# Patient Record
Sex: MALE | Race: WHITE | URBAN - METROPOLITAN AREA
[De-identification: names, ages, dates, MRNs, and addresses within clinical notes are randomized per-mention and may not be internally consistent; named-entity substitution may affect disease eponyms.]

---

## 2017-02-22 ENCOUNTER — OFFICE VISIT (OUTPATIENT)
Dept: FAMILY MEDICINE | Facility: CLINIC | Age: 52
End: 2017-02-22

## 2017-02-22 DIAGNOSIS — L72.3 INFLAMED EPIDERMOID CYST OF SKIN: Primary | ICD-10-CM

## 2017-02-22 PROCEDURE — 87070 CULTURE OTHR SPECIMN AEROBIC: CPT | Performed by: FAMILY MEDICINE

## 2017-02-22 PROCEDURE — 10061 I&D ABSCESS COMP/MULTIPLE: CPT | Performed by: FAMILY MEDICINE

## 2017-02-22 RX ORDER — SULFAMETHOXAZOLE/TRIMETHOPRIM 800-160 MG
1 TABLET ORAL 2 TIMES DAILY
Qty: 20 TABLET | Refills: 0 | Status: CANCELLED | OUTPATIENT
Start: 2017-02-22

## 2017-02-22 RX ORDER — CEPHALEXIN 500 MG/1
500 CAPSULE ORAL 3 TIMES DAILY
Qty: 30 CAPSULE | Refills: 0 | Status: SHIPPED | OUTPATIENT
Start: 2017-02-22 | End: 2017-03-04

## 2017-02-22 NOTE — MR AVS SNAPSHOT
After Visit Summary   2/22/2017    Maxx Yadav    MRN: [id number]           Patient Information     Date Of Birth          1965        Visit Information        Provider Department      2/22/2017 9:20 AM Davina Brambila MD [facility] - Primary Care Skin        Today's Diagnoses     Inflamed epidermoid cyst of skin    -  1      Care Instructions    FUTURE APPOINTMENTS    Follow up in 2 day(s) for evaluation of the packing and incision site.    Follow up in 6-8 weeks for excision of cyst.    INCISION AND DRAINAGE POST-CARE INSTRUCTIONS  The area was opened and pus was drained and cultured. A small ribbon of sterile gauze was placed into the abscess cavity. This provides the body an exit for any more pus and fluid to drain out, which will help the area heal much more quickly.    The next time you shower or bathe, remove the dressing and clean the area with soap and water. Neither soap, water or shampoo will hurt the surgical area. Apply some Vaseline, and use a bandage over the top. Take care to avoid pulling out the wick of the packed gauze. However, if it does come out, do not try to replace it. Dry the area well with a towel or hairdryer on cool setting. Cover the wound with a dry dressing as we expect the area to continue draining.    Please limit strenuous activity of this area to allow for optimal healing. Do not use Neosporin or other triple antibiotic ointments. Only use Vaseline.    Signs of Infection:  Thankfully this is rare. However if you notice increasing redness, increasing swelling at the site, increasing pain or pain with movement, fever, chills or other signs of infection, please call us at: [phone].    ORAL ANTIBIOTIC  Take by mouth 1 capsule/tablet of cephalexin 500 mg three time(s) a day for 10 days. Make sure to finish the entire antibiotic regimen.    PAIN CONTROL INSTRUCTIONS    First, try either acetaminophen (Tylenol) or a non-steroidal  anti-inflammatory drug (NSAID), such as ibuprofen (Advil) or naproxen (Aleve, Naprosyn).    Second, try combining both acetaminophen and an NSAID - often the combination will work better then either one alone.        Follow-ups after your visit        Follow-up notes from your care team     Return in about 2 days (around 2/24/2017).      Who to contact     If you have questions or need follow up information about today's clinic visit or your schedule please contact [facility] - PRIMARY CARE SKIN directly at [phone].  Normal or non-critical lab and imaging results will be communicated to you by Clickberryhart, letter or phone within 4 business days after the clinic has received the results. If you do not hear from us within 7 days, please contact the clinic through [employer] or phone. If you have a critical or abnormal lab result, we will notify you by phone as soon as possible.  Submit refill requests through [employer] or call your pharmacy and they will forward the refill request to us. Please allow 3 business days for your refill to be completed.          Additional Information About Your Visit        ClickberryharAutobase Information     [employer]or gives you secure access to your electronic health record. If you see a primary care provider, you can also send messages to your care team and make appointments. If you have questions, please call your primary care clinic.  If you do not have a primary care provider, please call [phone] and they will assist you.        Care EveryWhere ID     This is your Care EveryWhere ID. This could be used by other organizations to access your [facility] medical records  [id number]         Blood Pressure from Last 3 Encounters:   11/18/16 124/86   06/14/16 (!) 137/92   05/27/16 122/84    Weight from Last 3 Encounters:   11/18/16 228 lb (103.4 kg)   06/14/16 219 lb 14.4 oz (99.7 kg)   05/27/16 222 lb 3.2 oz (100.8 kg)              We Performed the Following     DRAIN SKIN ABSCESS  COMPLICATED/MULTIPLE     Wound Culture Aerobic Bacterial          Today's Medication Changes          These changes are accurate as of: 2/22/17  9:39 AM.  If you have any questions, ask your nurse or doctor.               Start taking these medicines.        Dose/Directions    cephALEXin 500 MG capsule   Commonly known as:  KEFLEX   Used for:  Inflamed epidermoid cyst of skin   Started by:  Davina Brambila MD        Dose:  500 mg   Take 1 capsule (500 mg) by mouth 3 times daily for 10 days   Quantity:  30 capsule   Refills:  0            Where to get your medicines      These medications were sent to [id number] - [place] - [address]  [address], [place] [address]     Phone:  [phone]     cephALEXin 500 MG capsule                Primary Care Provider Office Phone # Fax #    Raffy Strickland PA-C [phone]       [facility] [address] [place] [address] [address]  [place] [address]        Thank you!     Thank you for choosing [facility] - PRIMARY CARE SKIN  for your care. Our goal is always to provide you with excellent care. Hearing back from our patients is one way we can continue to improve our services. Please take a few minutes to complete the written survey that you may receive in the mail after your visit with us. Thank you!             Your Updated Medication List - Protect others around you: Learn how to safely use, store and throw away your medicines at www.disposemymeds.org.          This list is accurate as of: 2/22/17  9:39 AM.  Always use your most recent med list.                   Brand Name Dispense Instructions for use    buPROPion 300 MG 24 hr tablet    WELLBUTRIN XL    90 tablet    Take 1 tablet (300 mg) by mouth every morning       cephALEXin 500 MG capsule    KEFLEX    30 capsule    Take 1 capsule (500 mg) by mouth 3 times daily for 10 days       escitalopram 10 MG tablet    LEXAPRO    90 tablet     Take 1 tablet (10 mg) by mouth daily       hydrocortisone 25 MG Suppository    ANUSOL-HC    28 suppository    Place 1 suppository (25 mg) rectally 2 times daily       warfarin 7.5 MG tablet    COUMADIN     Take by mouth daily

## 2017-02-22 NOTE — PATIENT INSTRUCTIONS
FUTURE APPOINTMENTS    Follow up in 2 day(s) for evaluation of the packing and incision site.    Follow up in 6-8 weeks for excision of cyst.    INCISION AND DRAINAGE POST-CARE INSTRUCTIONS  The area was opened and pus was drained and cultured. A small ribbon of sterile gauze was placed into the abscess cavity. This provides the body an exit for any more pus and fluid to drain out, which will help the area heal much more quickly.    The next time you shower or bathe, remove the dressing and clean the area with soap and water. Neither soap, water or shampoo will hurt the surgical area. Apply some Vaseline, and use a bandage over the top. Take care to avoid pulling out the wick of the packed gauze. However, if it does come out, do not try to replace it. Dry the area well with a towel or hairdryer on cool setting. Cover the wound with a dry dressing as we expect the area to continue draining.    Please limit strenuous activity of this area to allow for optimal healing. Do not use Neosporin or other triple antibiotic ointments. Only use Vaseline.    Signs of Infection:  Thankfully this is rare. However if you notice increasing redness, increasing swelling at the site, increasing pain or pain with movement, fever, chills or other signs of infection, please call us at: [phone].    ORAL ANTIBIOTIC  Take by mouth 1 capsule/tablet of cephalexin 500 mg three time(s) a day for 10 days. Make sure to finish the entire antibiotic regimen.    PAIN CONTROL INSTRUCTIONS    First, try either acetaminophen (Tylenol) or a non-steroidal anti-inflammatory drug (NSAID), such as ibuprofen (Advil) or naproxen (Aleve, Naprosyn).    Second, try combining both acetaminophen and an NSAID - often the combination will work better then either one alone.

## 2017-02-24 ENCOUNTER — OFFICE VISIT (OUTPATIENT)
Dept: FAMILY MEDICINE | Facility: CLINIC | Age: 52
End: 2017-02-24

## 2017-02-24 DIAGNOSIS — Z48.00 CHANGE OR REMOVAL OF WOUND PACKING: Primary | ICD-10-CM

## 2017-02-24 LAB
BACTERIA SPEC CULT: NO GROWTH
MICRO REPORT STATUS: NORMAL
SPECIMEN SOURCE: NORMAL

## 2017-02-24 PROCEDURE — 99207 ZZC NO CHARGE NURSE ONLY: CPT | Performed by: FAMILY MEDICINE

## 2017-02-24 NOTE — MR AVS SNAPSHOT
After Visit Summary   2/24/2017    Maxx Yadav    MRN: [id number]           Patient Information     Date Of Birth          1965        Visit Information        Provider Department      2/24/2017 2:00 PM Davina Brambila MD [facility] Primary Care Skin        Today's Diagnoses     Change or removal of wound packing    -  1       Follow-ups after your visit        Your next 10 appointments already scheduled     Feb 27, 2017  2:00 PM CST   Office Visit with Davina Brambila MD   [facility] Primary Care Skin ([facility] )    [address] [place] [address]  [address] [place] [id number]   [phone]           Bring a current list of meds and any records pertaining to this visit.  For Physicals, please bring immunization records and any forms needing to be filled out.  Please arrive 10 minutes early to complete paperwork.              Who to contact     If you have questions or need follow up information about today's clinic visit or your schedule please contact [facility] PRIMARY CARE SKIN directly at [phone].  Normal or non-critical lab and imaging results will be communicated to you by Aperio Technologieshart, letter or phone within 4 business days after the clinic has received the results. If you do not hear from us within 7 days, please contact the clinic through Cupointt or phone. If you have a critical or abnormal lab result, we will notify you by phone as soon as possible.  Submit refill requests through [employer] or call your pharmacy and they will forward the refill request to us. Please allow 3 business days for your refill to be completed.          Additional Information About Your Visit        MyChart Information     [employer] gives you secure access to your electronic health record. If you see a primary care provider, you can also send messages to your care team and make appointments. If you have questions, please call your primary  care clinic.  If you do not have a primary care provider, please call [phone] and they will assist you.        Care EveryWhere ID     This is your Care EveryWhere ID. This could be used by other organizations to access your [facility] medical records  [id number]         Blood Pressure from Last 3 Encounters:   11/18/16 124/86   06/14/16 (!) 137/92   05/27/16 122/84    Weight from Last 3 Encounters:   11/18/16 228 lb (103.4 kg)   06/14/16 219 lb 14.4 oz (99.7 kg)   05/27/16 222 lb 3.2 oz (100.8 kg)              Today, you had the following     No orders found for display       Primary Care Provider Office Phone # Fax #    Raffy Strickland PA-C [phone]       [facility] [address]  [place] [address]        Thank you!     Thank you for choosing [facility] - PRIMARY CARE SKIN  for your care. Our goal is always to provide you with excellent care. Hearing back from our patients is one way we can continue to improve our services. Please take a few minutes to complete the written survey that you may receive in the mail after your visit with us. Thank you!             Your Updated Medication List - Protect others around you: Learn how to safely use, store and throw away your medicines at www.disposemymeds.org.          This list is accurate as of: 2/24/17  2:30 PM.  Always use your most recent med list.                   Brand Name Dispense Instructions for use    buPROPion 300 MG 24 hr tablet    WELLBUTRIN XL    90 tablet    Take 1 tablet (300 mg) by mouth every morning       cephALEXin 500 MG capsule    KEFLEX    30 capsule    Take 1 capsule (500 mg) by mouth 3 times daily for 10 days       escitalopram 10 MG tablet    LEXAPRO    90 tablet    Take 1 tablet (10 mg) by mouth daily       hydrocortisone 25 MG Suppository    ANUSOL-HC    28 suppository    Place 1 suppository (25 mg) rectally 2 times daily       warfarin 7.5 MG tablet    COUMADIN     Take by  mouth daily

## 2017-02-24 NOTE — PROGRESS NOTES
[facility] - PRIMARY CARE SKIN    CC : Packing advancement  SUBJECTIVE:                                                    Maxx Yadav is a 51 year old male who presents to clinic today for follow-up evaluation of a wound with packing placed 3 days ago after an I&D for an infected epidermal cyst.    They are taking no antibiotic.  Tenderness : No.  History of previous sebaceous cysts : Yes: .        Refer to electronic medical record (EMR) for past medical history and medications.      ROS : 14 point review of systems was negative except the symptoms listed above in the HPI.    This document serves as a record of the services and decisions personally performed and made by Kate Brambila MD. It was created on her behalf by Neymar Guzmán, a trained medical scribe.  The creation of this document is based on the scribe's personal observations and the provider's statements to the medical scribe.  Neymar Guzmán, February 24, 2017 2:18 PM      OBJECTIVE:                                                    GENERAL: healthy, alert and no distress  SKIN: Mendez Skin Type - II.  Trunk were examined. The dermatoscope was used to help evaluate pigmented lesions.  Skin Pertinent Findings:  Location : upper back, Less erythema and swelling. No active drainage.  Packing was removed and repacked. Dressing was applied    Diagnostic Test Results:  Results for orders placed or performed in visit on 02/22/17   Wound Culture Aerobic Bacterial   Result Value Ref Range    Specimen Description Skin ON Back MIDLINE     Culture Micro No growth     Micro Report Status FINAL 02/24/2017        MDM :       ASSESSMENT:                                                      Encounter Diagnosis   Name Primary?     Change or removal of wound packing Yes         PLAN:                                                    Recheck in 3 days for packing removal  Change dressing daily      PROCEDURES:                                                     None.    TT: 20 minutes.  CT: 15 minutes.      The information in this document, created by the medical scribe for me, accurately reflects the services I personally performed and the decisions made by me. I have reviewed and approved this document for accuracy prior to leaving the patient care area.  Kate Brambila MD February 24, 2017 2:18 PM  [facility] - PRIMARY CARE SKIN

## 2017-02-27 ENCOUNTER — OFFICE VISIT (OUTPATIENT)
Dept: FAMILY MEDICINE | Facility: CLINIC | Age: 52
End: 2017-02-27

## 2017-02-27 DIAGNOSIS — Z48.00 ENCOUNTER FOR ABSCESS PACKING REMOVAL: Primary | ICD-10-CM

## 2017-02-27 PROCEDURE — 99024 POSTOP FOLLOW-UP VISIT: CPT | Performed by: FAMILY MEDICINE

## 2017-02-27 RX ORDER — CEPHALEXIN 500 MG/1
500 CAPSULE ORAL 3 TIMES DAILY
Qty: 21 CAPSULE | Refills: 0 | Status: SHIPPED | OUTPATIENT
Start: 2017-02-27 | End: 2017-03-06

## 2017-02-27 NOTE — PROGRESS NOTES
[facility] - PRIMARY CARE SKIN    CC : Packing advancement  SUBJECTIVE:                                                    Maxx Yadav is a 51 year old male who presents to clinic today for follow-up evaluation of a wound with packing placed 3 days ago after an I&D for an infected epidermoid cyst on the back. He has been changing the dressing daily. The patient's girlfriend did notice a strip of gauze that came out with one dressing change.    Oral cephalexin antibiotic has been discontinued, because he accidentally dropped them in the toilet.  Tenderness : No.  History of previous sebaceous cysts : Yes: .    Refer to electronic medical record (EMR) for past medical history and medications.    ROS : 14 point review of systems was negative except the symptoms listed above in the HPI.    This document serves as a record of the services and decisions personally performed and made by Kate Brambila MD. It was created on her behalf by Neymar Guzámn, a trained medical scribe.  The creation of this document is based on the scribe's personal observations and the provider's statements to the medical scribe.  Neymar Guzmán, February 27, 2017 2:09 PM      OBJECTIVE:                                                    GENERAL: healthy, alert and no distress  SKIN: Mendez Skin Type - II.  Trunk were examined. The dermatoscope was used to help evaluate pigmented lesions.  Skin Pertinent Findings:  Upper back, midline : Less erythema and swelling. No active drainage.  Packing had fallen out. Irrigated. Dressing applied    Diagnostic Test Results:  Results for orders placed or performed in visit on 02/22/17   Wound Culture Aerobic Bacterial   Result Value Ref Range    Specimen Description Skin ON Back MIDLINE     Culture Micro No growth     Micro Report Status FINAL 02/24/2017            ASSESSMENT:                                                      Encounter Diagnosis   Name Primary?     Encounter for abscess packing removal  Yes         PLAN:                                                    Patient Instructions   FUTURE APPOINTMENTS  Follow up as needed.    INCISION AND DRAINAGE POST-CARE INSTRUCTIONS  The area was opened and pus was drained and cultured. A small ribbon of sterile gauze was placed into the abscess cavity. This provides the body an exit for any more pus and fluid to drain out, which will help the area heal much more quickly.    The next time you shower or bathe, remove the dressing and clean the area with soap and water. Neither soap, water or shampoo will hurt the surgical area. Apply some Vaseline, and use a bandage over the top. Take care to avoid pulling out the wick of the packed gauze. However, if it does come out, do not try to replace it. Dry the area well with a towel or hairdryer on cool setting. Cover the wound with a dry dressing as we expect the area to continue draining.    Please limit strenuous activity of this area to allow for optimal healing. Do not use Neosporin or other triple antibiotic ointments. Only use Vaseline.    Signs of Infection:  Thankfully this is rare. However if you notice increasing redness, increasing swelling at the site, increasing pain or pain with movement, fever, chills or other signs of infection, please call us at: [phone].    ORAL ANTIBIOTIC  Discontinue oral cephalexin (Keflex).    PAIN CONTROL INSTRUCTIONS    First, try either acetaminophen (Tylenol) or a non-steroidal anti-inflammatory drug (NSAID), such as ibuprofen (Advil) or naproxen (Aleve, Naprosyn).    Second, try combining both acetaminophen and an NSAID - often the combination will work better then either one alone.        PROCEDURES:                                                    None.    TT: 20 minutes.  CT: 15 minutes.      The information in this document, created by the medical scribe for me, accurately reflects the services I personally performed and the decisions made by me. I have reviewed and  approved this document for accuracy prior to leaving the patient care area.  Kate Brambila MD February 27, 2017 2:08 PM  [facility] - PRIMARY CARE SKIN

## 2017-02-27 NOTE — PATIENT INSTRUCTIONS
"FUTURE APPOINTMENTS  Follow up as needed if a cyst begins to recur at the site.    WOUND CARE INSTRUCTIONS  1. After 24 hours, change dressing daily.  2. Wash hands before every dressing change.  3. Wash the wound area with a mild soap, then rinse  4. Gently pat dry with a sterile gauze or Q-tip.  5. Apply Vaseline, Aquaphor, Polysporin ointment or Bacitracin ointment over entire wound. Do NOT use Neosporin - as many people react to neomycin.  6. Finally, cover with a bandage or sterile non-stick gauze with micropore paper tape.  7. Repeat once daily until wound has healed.    Do not let the wound dry out.  The wound will heal faster and with better cosmetic results if routinely kept moist with step #5. It is a false belief that a wound heals better when it is exposed to air and allowed to dry out.      Soap, water and shampoo will not hurt this area.    Do not go swimming or take baths, but showering is encouraged.    Limit use of the area where the procedure was done for a few days to allow for optimal healing.    If you experience bleeding:  Repeat steps #2-5 and hold firm pressure on the area for 10 minutes without checking to see if the bleeding has stopped. \"Checking\" pulls off the protective wound clot and restarts the bleeding all over again. Re-apply pressure for 10 minutes if necessary to stop bleeding.  Use additional sterile gauze and tape to maintain pressure once bleeding has stopped.    Signs of Infection:  Infection can occur in any area where skin has been disrupted.  If you notice persistent redness, swelling, colored drainage, increasing pain, fever or other signs of infection, please call us at: [phone] and ask to have me or my colleague paged. We will call you back to discuss.    PATIENT INFORMATION : WOUNDS  During the healing process you will notice a number of changes. All wounds develop a small halo of redness surrounding the wound.  This means healing is occurring. Severe itching " with extensive redness usually indicates sensitivity to the ointment or bandage tape used to dress the wound.  You should call our office if this develops.      Swelling  and/or discoloration around your surgical site is common, particularly when performed around the eye.    All wounds normally drain.  The larger the wound the more drainage there will be.  After 7-10 days, you will notice the wound beginning to shrink and new skin will begin to grow.  The wound is healed when you can see skin has formed over the entire area.  A healed wound has a healthy, shiny look to the surface and is red to dark pink in color to normalize.  Wounds may take approximately 4-6 weeks to heal.  Larger wounds may take 6-8 weeks. After the wound is healed you may discontinue dressing changes.    You may experience a sensation of tightness as your wound heals. This is normal and will gradually subside.    Your healed wound may be sensitive to temperature changes. This sensitivity improves with time, but if you re having a lot of discomfort, try to avoid temperature extremes.    Patients frequently experience itching after their wound appears to have healed because of the continue healing under the skin.  Plain Vaseline will help relieve the itching.    ORAL ANTIBIOTIC  Take by mouth 1 capsule/tablet of cephalexin 500 mg three time(s) a day for 7 more days. Make sure to finish the entire antibiotic regimen.      PAIN CONTROL INSTRUCTIONS    First, try either acetaminophen (Tylenol) or a non-steroidal anti-inflammatory drug (NSAID), such as ibuprofen (Advil) or naproxen (Aleve, Naprosyn).    Second, try combining both acetaminophen and an NSAID - often the combination will work better then either one alone.

## 2017-02-27 NOTE — MR AVS SNAPSHOT
"              After Visit Summary   2/27/2017    Maxx Yadav    MRN: [id number]           Patient Information     Date Of Birth          1965        Visit Information        Provider Department      2/27/2017 2:00 PM Davina Brambila MD [facility] - Primary Care Skin        Today's Diagnoses     Encounter for abscess packing removal    -  1      Care Instructions    FUTURE APPOINTMENTS  Follow up as needed if a cyst begins to recur at the site.    WOUND CARE INSTRUCTIONS  1. After 24 hours, change dressing daily.  2. Wash hands before every dressing change.  3. Wash the wound area with a mild soap, then rinse  4. Gently pat dry with a sterile gauze or Q-tip.  5. Apply Vaseline, Aquaphor, Polysporin ointment or Bacitracin ointment over entire wound. Do NOT use Neosporin - as many people react to neomycin.  6. Finally, cover with a bandage or sterile non-stick gauze with micropore paper tape.  7. Repeat once daily until wound has healed.    Do not let the wound dry out.  The wound will heal faster and with better cosmetic results if routinely kept moist with step #5. It is a false belief that a wound heals better when it is exposed to air and allowed to dry out.      Soap, water and shampoo will not hurt this area.    Do not go swimming or take baths, but showering is encouraged.    Limit use of the area where the procedure was done for a few days to allow for optimal healing.    If you experience bleeding:  Repeat steps #2-5 and hold firm pressure on the area for 10 minutes without checking to see if the bleeding has stopped. \"Checking\" pulls off the protective wound clot and restarts the bleeding all over again. Re-apply pressure for 10 minutes if necessary to stop bleeding.  Use additional sterile gauze and tape to maintain pressure once bleeding has stopped.    Signs of Infection:  Infection can occur in any area where skin has been disrupted.  If you notice persistent redness, swelling, " colored drainage, increasing pain, fever or other signs of infection, please call us at: [phone] and ask to have me or my colleague paged. We will call you back to discuss.    PATIENT INFORMATION : WOUNDS  During the healing process you will notice a number of changes. All wounds develop a small halo of redness surrounding the wound.  This means healing is occurring. Severe itching with extensive redness usually indicates sensitivity to the ointment or bandage tape used to dress the wound.  You should call our office if this develops.      Swelling  and/or discoloration around your surgical site is common, particularly when performed around the eye.    All wounds normally drain.  The larger the wound the more drainage there will be.  After 7-10 days, you will notice the wound beginning to shrink and new skin will begin to grow.  The wound is healed when you can see skin has formed over the entire area.  A healed wound has a healthy, shiny look to the surface and is red to dark pink in color to normalize.  Wounds may take approximately 4-6 weeks to heal.  Larger wounds may take 6-8 weeks. After the wound is healed you may discontinue dressing changes.    You may experience a sensation of tightness as your wound heals. This is normal and will gradually subside.    Your healed wound may be sensitive to temperature changes. This sensitivity improves with time, but if you re having a lot of discomfort, try to avoid temperature extremes.    Patients frequently experience itching after their wound appears to have healed because of the continue healing under the skin.  Plain Vaseline will help relieve the itching.    ORAL ANTIBIOTIC  Take by mouth 1 capsule/tablet of cephalexin 500 mg three time(s) a day for 7 more days. Make sure to finish the entire antibiotic regimen.      PAIN CONTROL INSTRUCTIONS    First, try either acetaminophen (Tylenol) or a non-steroidal anti-inflammatory drug (NSAID), such as ibuprofen  (Advil) or naproxen (Aleve, Naprosyn).    Second, try combining both acetaminophen and an NSAID - often the combination will work better then either one alone.        Follow-ups after your visit        Follow-up notes from your care team     Return if symptoms worsen or fail to improve.      Who to contact     If you have questions or need follow up information about today's clinic visit or your schedule please contact [facility] - PRIMARY CARE SKIN directly at [phone].  Normal or non-critical lab and imaging results will be communicated to you by Compressushart, letter or phone within 4 business days after the clinic has received the results. If you do not hear from us within 7 days, please contact the clinic through Compressushart or phone. If you have a critical or abnormal lab result, we will notify you by phone as soon as possible.  Submit refill requests through Miradia or call your pharmacy and they will forward the refill request to us. Please allow 3 business days for your refill to be completed.          Additional Information About Your Visit        CompressusharNeocis Information     Miradia gives you secure access to your electronic health record. If you see a primary care provider, you can also send messages to your care team and make appointments. If you have questions, please call your primary care clinic.  If you do not have a primary care provider, please call [phone] and they will assist you.        Care EveryWhere ID     This is your Care EveryWhere ID. This could be used by other organizations to access your [facility] medical records  [id number]         Blood Pressure from Last 3 Encounters:   11/18/16 124/86   06/14/16 (!) 137/92   05/27/16 122/84    Weight from Last 3 Encounters:   11/18/16 228 lb (103.4 kg)   06/14/16 219 lb 14.4 oz (99.7 kg)   05/27/16 222 lb 3.2 oz (100.8 kg)              Today, you had the following     No orders found for display       Primary Care Provider Office Phone # Fax #     Raffy Strickland PA-C [phone]       [facility]3] [place] [address]  [place] [address]        Thank you!     Thank you for choosing [facility]3] - PRIMARY CARE [facility]  for your care. Our goal is always to provide you with excellent care. Hearing back from our patients is one way we can continue to improve our services. Please take a few minutes to complete the written survey that you may receive in the mail after your visit with us. Thank you!             Your Updated Medication List - Protect others around you: Learn how to safely use, store and throw away your medicines at www.disposemymeds.org.          This list is accurate as of: 2/27/17  2:14 PM.  Always use your most recent med list.                   Brand Name Dispense Instructions for use    buPROPion 300 MG 24 hr tablet    WELLBUTRIN XL    90 tablet    Take 1 tablet (300 mg) by mouth every morning       cephALEXin 500 MG capsule    KEFLEX    30 capsule    Take 1 capsule (500 mg) by mouth 3 times daily for 10 days       escitalopram 10 MG tablet    LEXAPRO    90 tablet    Take 1 tablet (10 mg) by mouth daily       hydrocortisone 25 MG Suppository    ANUSOL-HC    28 suppository    Place 1 suppository (25 mg) rectally 2 times daily       warfarin 7.5 MG tablet    COUMADIN     Take by mouth daily

## 2017-04-28 ENCOUNTER — OFFICE VISIT (OUTPATIENT)
Dept: FAMILY MEDICINE | Facility: CLINIC | Age: 52
End: 2017-04-28

## 2017-04-28 VITALS
WEIGHT: 233 LBS | BODY MASS INDEX: 31.56 KG/M2 | HEART RATE: 78 BPM | OXYGEN SATURATION: 100 % | SYSTOLIC BLOOD PRESSURE: 130 MMHG | DIASTOLIC BLOOD PRESSURE: 82 MMHG | TEMPERATURE: 97.8 F | HEIGHT: 72 IN

## 2017-04-28 DIAGNOSIS — F33.9 RECURRENT MAJOR DEPRESSIVE DISORDER, REMISSION STATUS UNSPECIFIED (H): ICD-10-CM

## 2017-04-28 DIAGNOSIS — N50.819 TESTICULAR PAIN: Primary | ICD-10-CM

## 2017-04-28 PROCEDURE — 99214 OFFICE O/P EST MOD 30 MIN: CPT | Performed by: PHYSICIAN ASSISTANT

## 2017-04-28 RX ORDER — ESCITALOPRAM OXALATE 10 MG/1
10 TABLET ORAL DAILY
Qty: 90 TABLET | Refills: 1 | Status: SHIPPED | OUTPATIENT
Start: 2017-04-28 | End: 2017-07-11

## 2017-04-28 RX ORDER — BUPROPION HYDROCHLORIDE 300 MG/1
300 TABLET ORAL EVERY MORNING
Qty: 90 TABLET | Refills: 1 | Status: SHIPPED | OUTPATIENT
Start: 2017-04-28 | End: 2017-12-29

## 2017-04-28 NOTE — NURSING NOTE
Chief Complaint   Patient presents with     Recheck Medication       Initial /82 (BP Location: Left arm, Patient Position: Chair, Cuff Size: Adult Regular)  Pulse 78  Temp 97.8  F (36.6  C) (Tympanic)  Ht 6' (1.829 m)  Wt 233 lb (105.7 kg)  SpO2 100%  BMI 31.6 kg/m2 Estimated body mass index is 31.6 kg/(m^2) as calculated from the following:    Height as of this encounter: 6' (1.829 m).    Weight as of this encounter: 233 lb (105.7 kg).  Medication Reconciliation: complete

## 2017-04-28 NOTE — MR AVS SNAPSHOT
After Visit Summary   4/28/2017    Maxx Yadav    MRN: [id number]           Patient Information     Date Of Birth          1965        Visit Information        Provider Department      4/28/2017 10:00 AM Raffy Strickland PA-C [facility] [place]        Today's Diagnoses     Testicular pain    -  1    Recurrent major depressive disorder, remission status unspecified (H)           Follow-ups after your visit        Additional Services     UROLOGY ADULT REFERRAL       Your provider has referred you to: [facility]: [facility] - [place] [phone]   [email or web address]  N: [facility] - [place] [phone]   [email or web address]    Please be aware that coverage of these services is subject to the terms and limitations of your health insurance plan.  Call member services at your health plan with any benefit or coverage questions.      Please bring the following with you to your appointment:    (1) Any X-Rays, CTs or MRIs which have been performed.  Contact the facility where they were done to arrange for  prior to your scheduled appointment.    (2) List of current medications  (3) This referral request   (4) Any documents/labs given to you for this referral                  Who to contact     If you have questions or need follow up information about today's clinic visit or your schedule please contact [facility] directly at [phone].  Normal or non-critical lab and imaging results will be communicated to you by MyChart, letter or phone within 4 business days after the clinic has received the results. If you do not hear from us within 7 days, please contact the clinic through Q Designhart or phone. If you have a critical or abnormal lab result, we will notify you by phone as soon as possible.  Submit refill requests through BlogRadio or call your pharmacy  and they will forward the refill request to us. Please allow 3 business days for your refill to be completed.          Additional Information About Your Visit        MonitorTech Corporationhart Information     MobilePro gives you secure access to your electronic health record. If you see a primary care provider, you can also send messages to your care team and make appointments. If you have questions, please call your primary care clinic.  If you do not have a primary care provider, please call [phone] and they will assist you.        Care EveryWhere ID     This is your Care EveryWhere ID. This could be used by other organizations to access your [facility] medical records  [id number]        Your Vitals Were     Pulse Temperature Height Pulse Oximetry BMI (Body Mass Index)       78 97.8  F (36.6  C) (Tympanic) 6' (1.829 m) 100% 31.6 kg/m2        Blood Pressure from Last 3 Encounters:   04/28/17 130/82   11/18/16 124/86   06/14/16 (!) 137/92    Weight from Last 3 Encounters:   04/28/17 233 lb (105.7 kg)   11/18/16 228 lb (103.4 kg)   06/14/16 219 lb 14.4 oz (99.7 kg)              We Performed the Following     DEPRESSION ACTION PLAN (DAP)     UROLOGY ADULT REFERRAL          Where to get your medicines      These medications were sent to [id number] - [place] - [id number]  [address], [place] [address]     Phone:  [phone]     buPROPion 300 MG 24 hr tablet    escitalopram 10 MG tablet          Primary Care Provider Office Phone # Fax #    Raffy Strickland PA-C [phone]       [facility] [place] [address] [place] [address] [address]  [place] [address]        Thank you!     Thank you for choosing [facility] [place]  for your care. Our goal is always to provide you with excellent care. Hearing back from our patients is one way we can continue to improve our services. Please take a few minutes to complete the written survey that you may receive in the mail  after your visit with us. Thank you!             Your Updated Medication List - Protect others around you: Learn how to safely use, store and throw away your medicines at www.disposemymeds.org.          This list is accurate as of: 4/28/17 10:35 AM.  Always use your most recent med list.                   Brand Name Dispense Instructions for use    buPROPion 300 MG 24 hr tablet    WELLBUTRIN XL    90 tablet    Take 1 tablet (300 mg) by mouth every morning       escitalopram 10 MG tablet    LEXAPRO    90 tablet    Take 1 tablet (10 mg) by mouth daily       hydrocortisone 25 MG Suppository    ANUSOL-HC    28 suppository    Place 1 suppository (25 mg) rectally 2 times daily       warfarin 7.5 MG tablet    COUMADIN     Take by mouth daily

## 2017-04-28 NOTE — LETTER
My Depression Action Plan  Name: Maxx Yadav   Date of Birth 1965  Date: 4/28/2017    My doctor: Raffy Strickland   My clinic: [address] [address]  [phone]          GREEN    ZONE   Good Control    What it looks like:     Things are going generally well. You have normal up s and down s. You may even feel depressed from time to time, but bad moods usually last less than a day.   What you need to do:  1. Continue to care for yourself (see self care plan)  2. Check your depression survival kit and update it as needed  3. Follow your physician s recommendations including any medication.  4. Do not stop taking medication unless you consult with your physician first.           YELLOW         ZONE Getting Worse    What it looks like:     Depression is starting to interfere with your life.     It may be hard to get out of bed; you may be starting to isolate yourself from others.    Symptoms of depression are starting to last most all day and this has happened for several days.     You may have suicidal thoughts but they are not constant.   What you need to do:     1. Call your care team, your response to treatment will improve if you keep your care team informed of your progress. Yellow periods are signs an adjustment may need to be made.     2. Continue your self-care, even if you have to fake it!    3. Talk to someone in your support network    4. Open up your depression survival kit           RED    ZONE Medical Alert - Get Help    What it looks like:     Depression is seriously interfering with your life.     You may experience these or other symptoms: You can t get out of bed most days, can t work or engage in other necessary activities, you have trouble taking care of basic hygiene, or basic responsibilities, thoughts of suicide or death that will not go away, self-injurious behavior.     What you need to do:  1. Call your  care team and request a same-day appointment. If they are not available (weekends or after hours) call your local crisis line, emergency room or 911.      Electronically signed by: Raffy Strickland, April 28, 2017    Depression Self Care Plan / Survival Kit    Self-Care for Depression  Here s the deal. Your body and mind are really not as separate as most people think.  What you do and think affects how you feel and how you feel influences what you do and think. This means if you do things that people who feel good do, it will help you feel better.  Sometimes this is all it takes.  There is also a place for medication and therapy depending on how severe your depression is, so be sure to consult with your medical provider and/ or Behavioral Health Consultant if your symptoms are worsening or not improving.     In order to better manage my stress, I will:    Exercise  Get some form of exercise, every day. This will help reduce pain and release endorphins, the  feel good  chemicals in your brain. This is almost as good as taking antidepressants!  This is not the same as joining a gym and then never going! (they count on that by the way ) It can be as simple as just going for a walk or doing some gardening, anything that will get you moving.      Hygiene   Maintain good hygiene (Get out of bed in the morning, Make your bed, Brush your teeth, Take a shower, and Get dressed like you were going to work, even if you are unemployed).  If your clothes don't fit try to get ones that do.    Diet  I will strive to eat foods that are good for me, drink plenty of water, and avoid excessive sugar, caffeine, alcohol, and other mood-altering substances.  Some foods that are helpful in depression are: complex carbohydrates, B vitamins, flaxseed, fish or fish oil, fresh fruits and vegetables.    Psychotherapy  I agree to participate in Individual Therapy (if recommended).    Medication  If prescribed medications, I agree to take  them.  Missing doses can result in serious side effects.  I understand that drinking alcohol, or other illicit drug use, may cause potential side effects.  I will not stop my medication abruptly without first discussing it with my provider.    Staying Connected With Others  I will stay in touch with my friends, family members, and my primary care provider/team.    Use your imagination  Be creative.  We all have a creative side; it doesn t matter if it s oil painting, sand castles, or mud pies! This will also kick up the endorphins.    Witness Beauty  (AKA stop and smell the roses) Take a look outside, even in mid-winter. Notice colors, textures. Watch the squirrels and birds.     Service to others  Be of service to others.  There is always someone else in need.  By helping others we can  get out of ourselves  and remember the really important things.  This also provides opportunities for practicing all the other parts of the program.    Humor  Laugh and be silly!  Adjust your TV habits for less news and crime-drama and more comedy.    Control your stress  Try breathing deep, massage therapy, biofeedback, and meditation. Find time to relax each day.     My support system    Clinic Contact:  Phone number:    Contact 1:  Phone number:    Contact 2:  Phone number:    Congregational/:  Phone number:    Therapist:  Phone number:    Local crisis center:    Phone number:    Other community support:  Phone number:

## 2017-04-28 NOTE — PROGRESS NOTES
SUBJECTIVE:                                                    Maxx Yadav is a 51 year old male who presents to clinic today for the following health issues:      Medication Followup of Escitalopram and Bupropion     Taking Medication as prescribed: yes    Side Effects:  None    Medication Helping Symptoms:  Generally        Depression: Taking Lexapro 10 mg and Wellbutrin 300 mg, feels that his depression is well controlled on this regimen. No SI/HI, not currently seeing a therapist but does not feel that he needs to at this time.  Recently hired for new job at [employer] and is looking forward to this, things are going well with his girlfriend.  See PHQ9/GAD7      Testicular pain: ongoing x 15 years, assessed in the past by urology and underwent PT for this, no improvement.  He notes that pain continued at night and is very bothersome, no swelling noted            Problem list and histories reviewed & adjusted, as indicated.  Additional history: as documented    Patient Active Problem List   Diagnosis     Factor V Leiden (H)     History of deep venous thrombosis     CARDIOVASCULAR SCREENING; LDL GOAL LESS THAN 130     Major depressive disorder, recurrent episode, moderate (H)     Obesity (BMI 30.0-34.9)     Past Surgical History:   Procedure Laterality Date     VARICOCELECTOMY  2001       Social History   Substance Use Topics     Smoking status: Never Smoker     Smokeless tobacco: Never Used     Alcohol use No     Family History   Problem Relation Age of Onset     DIABETES Father      type 2     DIABETES Sister      type 2     Hypertension Mother      Blood Disease Maternal Grandfather      Factor V age 45         Current Outpatient Prescriptions   Medication Sig Dispense Refill     buPROPion (WELLBUTRIN XL) 300 MG 24 hr tablet Take 1 tablet (300 mg) by mouth every morning 90 tablet 1     escitalopram (LEXAPRO) 10 MG tablet Take 1 tablet (10 mg) by mouth daily 90 tablet 1     hydrocortisone (ANUSOL-HC) 25 MG  suppository Place 1 suppository (25 mg) rectally 2 times daily 28 suppository 2     warfarin (COUMADIN) 7.5 MG tablet Take by mouth daily       [DISCONTINUED] buPROPion (WELLBUTRIN XL) 300 MG 24 hr tablet Take 1 tablet (300 mg) by mouth every morning 90 tablet 1     [DISCONTINUED] escitalopram (LEXAPRO) 10 MG tablet Take 1 tablet (10 mg) by mouth daily 90 tablet 1     No Known Allergies    Reviewed and updated as needed this visit by clinical staff       Reviewed and updated as needed this visit by Provider         ROS:  Constitutional, HEENT, cardiovascular, pulmonary, gi and gu systems are negative, except as otherwise noted.    OBJECTIVE:                                                    /82 (BP Location: Left arm, Patient Position: Chair, Cuff Size: Adult Regular)  Pulse 78  Temp 97.8  F (36.6  C) (Tympanic)  Ht 6' (1.829 m)  Wt 233 lb (105.7 kg)  SpO2 100%  BMI 31.6 kg/m2  Body mass index is 31.6 kg/(m^2).  GENERAL: healthy, alert and no distress  EYES: Eyes grossly normal to inspection, PERRLA  PSYCH: mentation appears normal, affect normal/bright    Diagnostic Test Results:  none      ASSESSMENT/PLAN:                                                      1. Recurrent major depressive disorder, remission status unspecified (H)  Well controlled, [facility] sooner if needed by patient.  - buPROPion (WELLBUTRIN XL) 300 MG 24 hr tablet; Take 1 tablet (300 mg) by mouth every morning  Dispense: 90 tablet; Refill: 1  - escitalopram (LEXAPRO) 10 MG tablet; Take 1 tablet (10 mg) by mouth daily  Dispense: 90 tablet; Refill: 1  - DEPRESSION ACTION PLAN (DAP)    2. Testicular pain  Advise that patient follow up with urology for further management.  - UROLOGY ADULT REFERRAL     total time: 20 minutes, > 50% of time spent counseling  See Patient Instructions    Raffy Strickland PA-C  [facility]

## 2017-04-29 ASSESSMENT — PATIENT HEALTH QUESTIONNAIRE - PHQ9: SUM OF ALL RESPONSES TO PHQ QUESTIONS 1-9: 6

## 2017-07-07 ENCOUNTER — MYC MEDICAL ADVICE (OUTPATIENT)
Dept: FAMILY MEDICINE | Facility: CLINIC | Age: 52
End: 2017-07-07

## 2017-07-07 NOTE — TELEPHONE ENCOUNTER
Can we please call patient and get more information.  Is he currently having thoughts of self harm or plan?

## 2017-07-07 NOTE — TELEPHONE ENCOUNTER
Agree with plan set by RN.  Patient to be seen emergently if increased SI or plan.  Will follow up Tuesday with him, sooner if needed by patient.

## 2017-07-07 NOTE — TELEPHONE ENCOUNTER
PHQ-9 SCORE 11/18/2016 4/28/2017 7/7/2017   Total Score - - -   Total Score MyChart - - 16 (Moderately severe depression)   Total Score 5 6 16     Patient sent information for ER as well as [facility] Crisis number. See patients note below and advise,  Maria Isabel Mejia RN  Triage [employer]

## 2017-07-07 NOTE — TELEPHONE ENCOUNTER
Patient states that he thinks of suicide it and tries to dismiss it.  Denies specific plan.   Patient states difficulties finding job recently causing increase in symtpoms.  States has discussed feelings and status with GF and she has been a good support.  States he is safe now and discussed with RN crisis line, ER and patient states he does not have insurance right now due to not having a job and so he would rather come in for appointment.  Patient reports taking medications as ordered.      Declined earliest appointment Monday due to Job training meetings but did schedule for Tuesday.  Did reinforce emergency help with ER and Crisis numbers and 24 hour RN line.    Maria [place] Mejia RN  Triage [employer]

## 2017-07-07 NOTE — TELEPHONE ENCOUNTER
Please see My Chart message below and advise as appropriate.  Sent patient PHQ-9 and questions regarding medication compliance.    Maria Isabel Mejia RN  Triage [employer]

## 2017-07-11 ENCOUNTER — OFFICE VISIT (OUTPATIENT)
Dept: FAMILY MEDICINE | Facility: CLINIC | Age: 52
End: 2017-07-11

## 2017-07-11 VITALS
DIASTOLIC BLOOD PRESSURE: 87 MMHG | BODY MASS INDEX: 31.56 KG/M2 | HEART RATE: 81 BPM | SYSTOLIC BLOOD PRESSURE: 122 MMHG | HEIGHT: 72 IN | OXYGEN SATURATION: 95 % | WEIGHT: 233 LBS | RESPIRATION RATE: 14 BRPM | TEMPERATURE: 97.8 F

## 2017-07-11 DIAGNOSIS — F33.1 MODERATE EPISODE OF RECURRENT MAJOR DEPRESSIVE DISORDER (H): Primary | ICD-10-CM

## 2017-07-11 DIAGNOSIS — F41.9 ANXIETY: ICD-10-CM

## 2017-07-11 PROCEDURE — 99214 OFFICE O/P EST MOD 30 MIN: CPT | Performed by: PHYSICIAN ASSISTANT

## 2017-07-11 ASSESSMENT — ANXIETY QUESTIONNAIRES
5. BEING SO RESTLESS THAT IT IS HARD TO SIT STILL: NOT AT ALL
IF YOU CHECKED OFF ANY PROBLEMS ON THIS QUESTIONNAIRE, HOW DIFFICULT HAVE THESE PROBLEMS MADE IT FOR YOU TO DO YOUR WORK, TAKE CARE OF THINGS AT HOME, OR GET ALONG WITH OTHER PEOPLE: SOMEWHAT DIFFICULT
GAD7 TOTAL SCORE: 9
6. BECOMING EASILY ANNOYED OR IRRITABLE: NEARLY EVERY DAY
1. FEELING NERVOUS, ANXIOUS, OR ON EDGE: MORE THAN HALF THE DAYS
7. FEELING AFRAID AS IF SOMETHING AWFUL MIGHT HAPPEN: SEVERAL DAYS
3. WORRYING TOO MUCH ABOUT DIFFERENT THINGS: SEVERAL DAYS
2. NOT BEING ABLE TO STOP OR CONTROL WORRYING: SEVERAL DAYS

## 2017-07-11 ASSESSMENT — PATIENT HEALTH QUESTIONNAIRE - PHQ9: 5. POOR APPETITE OR OVEREATING: SEVERAL DAYS

## 2017-07-11 NOTE — MR AVS SNAPSHOT
After Visit Summary   7/11/2017    Maxx Yadav    MRN: [id number]           Patient Information     Date Of Birth          1965        Visit Information        Provider Department      7/11/2017 3:40 PM Raffy Strickland PA-C [facility] [place]        Today's Diagnoses     Moderate episode of recurrent major depressive disorder (H)    -  1    Anxiety           Follow-ups after your visit        Additional Services     MENTAL HEALTH REFERRAL       Your provider has referred you to: [facility]: [facility] - Counseling (Individual/Couples/Family) - [facility] [place] [phone]   [email or web address]   *Patient will be contacted by [facility] scheduling partner[facility]), to schedule an appointment.  Patients may also call [facility] to schedule.    All scheduling is subject to the client's specific insurance plan & benefits, provider/location availability, and provider clinical specialities.  Please arrive 15 minutes early for your first appointment and bring your completed paperwork.    Please be aware that coverage of these services is subject to the terms and limitations of your health insurance plan.  Call member services at your health plan with any benefit or coverage questions.                  Who to contact     If you have questions or need follow up information about today's clinic visit or your schedule please contact [facility] directly at [phone].  Normal or non-critical lab and imaging results will be communicated to you by MyChart, letter or phone within 4 business days after the clinic has received the results. If you do not hear from us within 7 days, please contact the clinic through MyChart or phone. If you have a critical or abnormal lab result, we will notify you by phone as soon as possible.  Submit refill requests through [employer] or call  your pharmacy and they will forward the refill request to us. Please allow 3 business days for your refill to be completed.          Additional Information About Your Visit        [employer] Information     [employer] gives you secure access to your electronic health record. If you see a primary care provider, you can also send messages to your care team and make appointments. If you have questions, please call your primary care clinic.  If you do not have a primary care provider, please call [phone] and they will assist you.        Care EveryWhere ID     This is your Care EveryWhere ID. This could be used by other organizations to access your [place] medical records  [id number]        Your Vitals Were     Pulse Temperature Respirations Height Pulse Oximetry BMI (Body Mass Index)    81 97.8  F (36.6  C) (Tympanic) 14 6' (1.829 m) 95% 31.6 kg/m2       Blood Pressure from Last 3 Encounters:   07/11/17 122/87   04/28/17 130/82   11/18/16 124/86    Weight from Last 3 Encounters:   07/11/17 233 lb (105.7 kg)   04/28/17 233 lb (105.7 kg)   11/18/16 228 lb (103.4 kg)              We Performed the Following     DEPRESSION ACTION PLAN (DAP)     MENTAL HEALTH REFERRAL          Today's Medication Changes          These changes are accurate as of: 7/11/17 11:59 PM.  If you have any questions, ask your nurse or doctor.               Start taking these medicines.        Dose/Directions    sertraline 50 MG tablet   Commonly known as:  ZOLOFT   Used for:  Moderate episode of recurrent major depressive disorder (H), Anxiety   Started by:  Raffy Strickland PA-C        Take 1/2 tablet (25 mg) for 1-2 weeks, then increase to 1 tablet orally daily   Quantity:  90 tablet   Refills:  1            Where to get your medicines      These medications were sent to [id number] - [place] - [address]  [address], [place] [address]     Phone:  [phone]     sertraline 50 MG tablet                 Primary Care Provider Office Phone # Fax #    Raffy Strickland PA-C [phone]       [facility] [place] [address]  [place] [address]        Equal Access to Services     CHINTAN MORTON : Hadii aad ku hadasho Soomaali, waaxda luqadaha, qaybta kaalmada adeegyada, waxay idiin hayaan adeeg khmissaelsh ladianne luna. So [facility] [phone].    ATENCIÓN: Si habla español, tiene a bowman disposición servicios gratuitos de asistencia lingüística. Llame al [phone].    We comply with applicable federal civil rights laws and [place] laws. We do not discriminate on the basis of race, color, national origin, age, disability sex, sexual orientation or gender identity.            Thank you!     Thank you for choosing [facility] [place]  for your care. Our goal is always to provide you with excellent care. Hearing back from our patients is one way we can continue to improve our services. Please take a few minutes to complete the written survey that you may receive in the mail after your visit with us. Thank you!             Your Updated Medication List - Protect others around you: Learn how to safely use, store and throw away your medicines at www.disposemymeds.org.          This list is accurate as of: 7/11/17 11:59 PM.  Always use your most recent med list.                   Brand Name Dispense Instructions for use Diagnosis    buPROPion 300 MG 24 hr tablet    WELLBUTRIN XL    90 tablet    Take 1 tablet (300 mg) by mouth every morning    Recurrent major depressive disorder, remission status unspecified (H)       hydrocortisone 25 MG Suppository    ANUSOL-HC    28 suppository    Place 1 suppository (25 mg) rectally 2 times daily    External hemorrhoids       sertraline 50 MG tablet    ZOLOFT    90 tablet    Take 1/2 tablet (25 mg) for 1-2 weeks, then increase to 1 tablet orally daily    Moderate episode of recurrent major depressive disorder (H), Anxiety       warfarin 7.5  MG tablet    COUMADIN     Take by mouth daily

## 2017-07-11 NOTE — NURSING NOTE
Chief Complaint   Patient presents with     Depression       Initial /87 (BP Location: Left arm, Patient Position: Chair, Cuff Size: Adult Large)  Pulse 81  Temp 97.8  F (36.6  C) (Tympanic)  Resp 14  Ht 6' (1.829 m)  Wt 233 lb (105.7 kg)  SpO2 95%  BMI 31.6 kg/m2 Estimated body mass index is 31.6 kg/(m^2) as calculated from the following:    Height as of this encounter: 6' (1.829 m).    Weight as of this encounter: 233 lb (105.7 kg).  Medication Reconciliation: complete    Danielle Ware MA

## 2017-07-11 NOTE — LETTER
My Depression Action Plan  Name: Maxx Yadav   Date of Birth 1965  Date: 7/11/2017    My doctor: Raffy Strickland   My clinic: [address] [address]  [phone]          GREEN    ZONE   Good Control    What it looks like:     Things are going generally well. You have normal up s and down s. You may even feel depressed from time to time, but bad moods usually last less than a day.   What you need to do:  1. Continue to care for yourself (see self care plan)  2. Check your depression survival kit and update it as needed  3. Follow your physician s recommendations including any medication.  4. Do not stop taking medication unless you consult with your physician first.           YELLOW         ZONE Getting Worse    What it looks like:     Depression is starting to interfere with your life.     It may be hard to get out of bed; you may be starting to isolate yourself from others.    Symptoms of depression are starting to last most all day and this has happened for several days.     You may have suicidal thoughts but they are not constant.   What you need to do:     1. Call your care team, your response to treatment will improve if you keep your care team informed of your progress. Yellow periods are signs an adjustment may need to be made.     2. Continue your self-care, even if you have to fake it!    3. Talk to someone in your support network    4. Open up your depression survival kit           RED    ZONE Medical Alert - Get Help    What it looks like:     Depression is seriously interfering with your life.     You may experience these or other symptoms: You can t get out of bed most days, can t work or engage in other necessary activities, you have trouble taking care of basic hygiene, or basic responsibilities, thoughts of suicide or death that will not go away, self-injurious behavior.     What you need to do:  1. Call your  care team and request a same-day appointment. If they are not available (weekends or after hours) call your local crisis line, emergency room or 911.      Electronically signed by: Raffy Strickland, July 11, 2017    Depression Self Care Plan / Survival Kit    Self-Care for Depression  Here s the deal. Your body and mind are really not as separate as most people think.  What you do and think affects how you feel and how you feel influences what you do and think. This means if you do things that people who feel good do, it will help you feel better.  Sometimes this is all it takes.  There is also a place for medication and therapy depending on how severe your depression is, so be sure to consult with your medical provider and/ or Behavioral Health Consultant if your symptoms are worsening or not improving.     In order to better manage my stress, I will:    Exercise  Get some form of exercise, every day. This will help reduce pain and release endorphins, the  feel good  chemicals in your brain. This is almost as good as taking antidepressants!  This is not the same as joining a gym and then never going! (they count on that by the way ) It can be as simple as just going for a walk or doing some gardening, anything that will get you moving.      Hygiene   Maintain good hygiene (Get out of bed in the morning, Make your bed, Brush your teeth, Take a shower, and Get dressed like you were going to work, even if you are unemployed).  If your clothes don't fit try to get ones that do.    Diet  I will strive to eat foods that are good for me, drink plenty of water, and avoid excessive sugar, caffeine, alcohol, and other mood-altering substances.  Some foods that are helpful in depression are: complex carbohydrates, B vitamins, flaxseed, fish or fish oil, fresh fruits and vegetables.    Psychotherapy  I agree to participate in Individual Therapy (if recommended).    Medication  If prescribed medications, I agree to take  them.  Missing doses can result in serious side effects.  I understand that drinking alcohol, or other illicit drug use, may cause potential side effects.  I will not stop my medication abruptly without first discussing it with my provider.    Staying Connected With Others  I will stay in touch with my friends, family members, and my primary care provider/team.    Use your imagination  Be creative.  We all have a creative side; it doesn t matter if it s oil painting, sand castles, or mud pies! This will also kick up the endorphins.    Witness Beauty  (AKA stop and smell the roses) Take a look outside, even in mid-winter. Notice colors, textures. Watch the squirrels and birds.     Service to others  Be of service to others.  There is always someone else in need.  By helping others we can  get out of ourselves  and remember the really important things.  This also provides opportunities for practicing all the other parts of the program.    Humor  Laugh and be silly!  Adjust your TV habits for less news and crime-drama and more comedy.    Control your stress  Try breathing deep, massage therapy, biofeedback, and meditation. Find time to relax each day.     My support system    Clinic Contact:  Phone number:    Contact 1:  Phone number:    Contact 2:  Phone number:    Congregation/:  Phone number:    Therapist:  Phone number:    Local crisis center:    Phone number:    Other community support:  Phone number:

## 2017-07-11 NOTE — PROGRESS NOTES
SUBJECTIVE:                                                    Maxx Yadav is a 51 year old male who presents to clinic today for the following health issues:      Depression Followup    Status since last visit: Worsened by job situation due to unemployment    See PHQ-9 for current symptoms.  Other associated symptoms: None    Complicating factors:   Significant life event:  Hard to find a job. Hard time functioning and thinking   Current substance abuse:  None  Anxiety or Panic symptoms:  Yes-  somewhat    PHQ-9  English  PHQ-9   Any Language    Amount of exercise or physical activity: 4-5 days/week for an average of 45-60 minutes    Problems taking medications regularly: No    Medication side effects: none    Diet: regular (no restrictions)      Additional concerns:     Maxx has been struggling with anxiety and depression for many years.  Currently he takes Wellbutrin  mg daily and Lexapro 10 mg daily which was previously helping his symptoms.  Over the past few months he has been having difficulty finding a full time job and has been working many temp jobs.  1 week ago, his last temp job ended and he is currently unemployed and very stressed about this.  He lives with his girlfriend who is very supportive of his situation.  Over the past week he has been having fleeting thoughts of self harm and hopelessness, he denies active plan or current thoughts of self harm today.  No HI, no AH/VH.  Taking meds appropriately, no substance use.    Problem list and histories reviewed & adjusted, as indicated.  Additional history: as documented    Patient Active Problem List   Diagnosis     Factor V Leiden (H)     History of deep venous thrombosis     CARDIOVASCULAR SCREENING; LDL GOAL LESS THAN 130     Major depressive disorder, recurrent episode, moderate (H)     Obesity (BMI 30.0-34.9)     Past Surgical History:   Procedure Laterality Date     VARICOCELECTOMY  2001       Social History   Substance Use Topics      Smoking status: Never Smoker     Smokeless tobacco: Never Used     Alcohol use No     Family History   Problem Relation Age of Onset     DIABETES Father      type 2     DIABETES Sister      type 2     Hypertension Mother      Blood Disease Maternal Grandfather      Factor V age 45         Current Outpatient Prescriptions   Medication Sig Dispense Refill     sertraline (ZOLOFT) 50 MG tablet Take 1/2 tablet (25 mg) for 1-2 weeks, then increase to 1 tablet orally daily 90 tablet 1     buPROPion (WELLBUTRIN XL) 300 MG 24 hr tablet Take 1 tablet (300 mg) by mouth every morning 90 tablet 1     hydrocortisone (ANUSOL-HC) 25 MG suppository Place 1 suppository (25 mg) rectally 2 times daily 28 suppository 2     warfarin (COUMADIN) 7.5 MG tablet Take by mouth daily       No Known Allergies    Reviewed and updated as needed this visit by clinical staff       Reviewed and updated as needed this visit by Provider         ROS:  Constitutional, HEENT, cardiovascular, pulmonary, gi and gu systems are negative, except as otherwise noted.    OBJECTIVE:     /87 (BP Location: Left arm, Patient Position: Chair, Cuff Size: Adult Large)  Pulse 81  Temp 97.8  F (36.6  C) (Tympanic)  Resp 14  Ht 6' (1.829 m)  Wt 233 lb (105.7 kg)  SpO2 95%  BMI 31.6 kg/m2  Body mass index is 31.6 kg/(m^2).  GENERAL: healthy, alert and no distress  PSYCH: mentation appears normal, affect normal/bright, no SI/HI today    Diagnostic Test Results:  none     ASSESSMENT/PLAN:           1. Moderate episode of recurrent major depressive disorder (H)  Acute worsening of depression and anxiety symptoms due to job loss and financial stress.  + recent thoughts of self harm but not current.  Had a long discussion with Maxx today about plans moving forward.  He currently does not have insurance but is interested in counseling.  I have provided him with crisis numbers and numbers to [facility] where he may be able to find free or inexpensive counseling services.   Today we will change Lexapro to zoloft  And titrate to 50 mg as this worked well for him in years past.  Contracts for safety. He will follow up by phone in 2 weeks, or be seen sooner if needed by patient.  - sertraline (ZOLOFT) 50 MG tablet; Take 1/2 tablet (25 mg) for 1-2 weeks, then increase to 1 tablet orally daily  Dispense: 90 tablet; Refill: 1  - MENTAL HEALTH REFERRAL    2. Anxiety  - sertraline (ZOLOFT) 50 MG tablet; Take 1/2 tablet (25 mg) for 1-2 weeks, then increase to 1 tablet orally daily  Dispense: 90 tablet; Refill: 1  - MENTAL HEALTH REFERRAL    Total time: 30 minutes: > 50% of time spent counseling and coordinating care and counseling    See Patient Instructions    Raffy Strickland PA-C  [facility]

## 2017-07-12 ASSESSMENT — ANXIETY QUESTIONNAIRES: GAD7 TOTAL SCORE: 9

## 2017-07-12 ASSESSMENT — PATIENT HEALTH QUESTIONNAIRE - PHQ9: SUM OF ALL RESPONSES TO PHQ QUESTIONS 1-9: 25

## 2017-10-30 PROBLEM — F41.9 ANXIETY: Status: ACTIVE | Noted: 2017-10-30

## 2018-01-25 ENCOUNTER — TELEPHONE (OUTPATIENT)
Dept: FAMILY MEDICINE | Facility: CLINIC | Age: 53
End: 2018-01-25

## 2018-01-25 DIAGNOSIS — F41.9 ANXIETY: ICD-10-CM

## 2018-01-25 DIAGNOSIS — F33.1 MODERATE EPISODE OF RECURRENT MAJOR DEPRESSIVE DISORDER (H): ICD-10-CM

## 2018-01-25 NOTE — TELEPHONE ENCOUNTER
Reason for Call:  Medication or medication refill:    Do you use a [facility]?  Name of the pharmacy and phone number for the current request:  [facility]  - [phone]    Name of the medication requested: sertraline (ZOLOFT) 50 MG tablet    Other request: Pt has made an appt  & is almost out again.    Can we leave a detailed message on this number? YES    Phone number patient can be reached at: Cell number on file:    Telephone Information:   Mobile [phone]       Best Time: any      Call taken on 1/25/2018 at 10:41 AM by Halley Baker

## 2018-01-25 NOTE — TELEPHONE ENCOUNTER
Patient has refills remaining with pharmacy.  Maria Isabel Mejia RN - Triage  [facility]

## 2018-01-25 NOTE — TELEPHONE ENCOUNTER
"Requested Prescriptions   Pending Prescriptions Disp Refills     sertraline (ZOLOFT) 50 MG tablet  Last Written Prescription Date:  12-  Last Fill Quantity: 90 tablet,  # refills: 1   Last Office Visit with [facility] provider:  7-   Future Office Visit:    Next 5 appointments (look out 90 days)     Feb 02, 2018  7:20 AM CST   Office Visit with Raffy Strickland PA-C   [facility] ([facility])    [address] [place] [id number] [phone]                  90 tablet 1     Sig: Take 1 tablet (50 mg) by mouth daily    SSRIs Protocol Failed  PHQ-9 SCORE 7/7/2017 7/11/2017 12/29/2017   Total Score - - -   Total Score MyChart 16 (Moderately severe depression) - 6 (Mild depression)   Total Score 16 25 6     CY-7 SCORE 5/26/2016 11/18/2016 7/11/2017   Total Score - - -   Total Score 7 1 9           1/25/2018 10:43 AM       Failed - PHQ-9 score less than 5 in past 6 months    The PHQ-9 criteria is meant to fail. It requires a PHQ-9 score review         Passed - Patient is age 18 or older       Passed - Recent (6 mo) or future visit with authorizing provider's specialty    Patient had office visit in the last 6 months or has a visit in the next 30 days with authorizing provider.  See \"Patient Info\" tab in inbasket, or \"Choose Columns\" in Meds & Orders section of the refill encounter.              "

## 2018-01-30 NOTE — TELEPHONE ENCOUNTER
Pt would like call as he is not aware of his refills left at the pharmacy. Can someone please call him to discuss.   PH: [phone] ok to leave detailed.

## 2018-01-30 NOTE — TELEPHONE ENCOUNTER
Patient notified to contact pharmacy and speak to agent due to old script does not have refills on the bottle so new number assigned.  Maria Isabel Mejia RN - Triage  [facility]

## 2020-03-02 ENCOUNTER — HEALTH MAINTENANCE LETTER (OUTPATIENT)
Age: 55
End: 2020-03-02

## 2020-12-20 ENCOUNTER — HEALTH MAINTENANCE LETTER (OUTPATIENT)
Age: 55
End: 2020-12-20

## 2021-04-24 ENCOUNTER — HEALTH MAINTENANCE LETTER (OUTPATIENT)
Age: 56
End: 2021-04-24

## 2021-05-26 ENCOUNTER — RECORDS - HEALTHEAST (OUTPATIENT)
Dept: ADMINISTRATIVE | Facility: CLINIC | Age: 56
End: 2021-05-26

## 2021-10-03 ENCOUNTER — HEALTH MAINTENANCE LETTER (OUTPATIENT)
Age: 56
End: 2021-10-03

## 2022-05-15 ENCOUNTER — HEALTH MAINTENANCE LETTER (OUTPATIENT)
Age: 57
End: 2022-05-15

## 2022-09-10 ENCOUNTER — HEALTH MAINTENANCE LETTER (OUTPATIENT)
Age: 57
End: 2022-09-10

## 2023-01-22 ENCOUNTER — HEALTH MAINTENANCE LETTER (OUTPATIENT)
Age: 58
End: 2023-01-22

## 2024-02-18 ENCOUNTER — HEALTH MAINTENANCE LETTER (OUTPATIENT)
Age: 59
End: 2024-02-18

## 2024-09-26 NOTE — PROGRESS NOTES
Okay can we try switching to that and if its [facility] they would have access to the notes, etc.    [facility] - PRIMARY CARE SKIN    CC : cyst   SUBJECTIVE:                                                    Maxx Yadav is a 51 year old male who presents to clinic today because of a cyst on the back that has been a recurring issue. This cyst first occurred in 2005, and a recurrence in Nov 2014 years ago was excised (I and D was done at that time; he does not recall any stitches placed). The cyst has broken open last night.    Enlarging : Yes.  Tenderness : Yes - it is sore.  History of previous epidermoid cysts : YES - recurring cyst at this site.    Issue Two : He is also suspicious of warts on the penis.    Sun Exposure History  Sunscreen Use : YES.  UV-protective clothing use : NO.  Wide-brimmed hats : YES.  UV-protective sunglasses : YES.  Avoids mid-day sun : YES.  Previous history of significant sun exposure:  Blistering sunburns : NO.  Tanning beds : NO.    Personal history of skin cancer : NO.  Family history of skin cancer : NO.    Refer to electronic medical record (EMR) for past medical history and medications.    INTEGUMENTARY/SKIN: POSITIVE for lumps or bumps  ROS : 14 point review of systems was negative except the symptoms listed above in the HPI.    This document serves as a record of the services and decisions personally performed and made by Kate Brambila MD. It was created on her behalf by Neymar Guzmán, a trained medical scribe.  The creation of this document is based on the scribe's personal observations and the provider's statements to the medical scribe.  Nyemar Guzmán, February 22, 2017 9:14 AM      OBJECTIVE:                                                    GENERAL: healthy, alert and no distress  SKIN: Mendez Skin Type - I.  Trunk and Genitalia were examined. The dermatoscope was used to help evaluate pigmented lesions.  Skin Pertinent Findings:  Back, midline : 5 mm opening with yellowish drainage with surrounding 2 cm area of induration and erythema most consistent with an  infected/inflamed epidermoid cyst that is tender.    Penis, ventral shaft : Small, 1 mm in size, subepidermal masses most consistent epidermoid cysts.    Diagnostic Test Results:  No results found for this or any previous visit (from the past 24 hour(s)).  Wound culture pending.    MDM : . Discussion regarding treatment options for epidermoid cysts, including observation and excision. Discussed need for packing because of the inflammation.  Discussed risks of the excision procedure, including infection and scarring. It was explained that the cyst can reoccur, especially if it has become inflamed or infected prior to removal.      ASSESSMENT:                                                      Encounter Diagnosis   Name Primary?     Inflamed epidermoid cyst of skin Yes           PLAN:                                                    Patient Instructions   FUTURE APPOINTMENTS    Follow up in 2 day(s) for evaluation of the packing and incision site.    Follow up in 6-8 weeks for excision of cyst.    INCISION AND DRAINAGE POST-CARE INSTRUCTIONS  The area was opened and pus was drained and cultured. A small ribbon of sterile gauze was placed into the abscess cavity. This provides the body an exit for any more pus and fluid to drain out, which will help the area heal much more quickly.    The next time you shower or bathe, remove the dressing and clean the area with soap and water. Neither soap, water or shampoo will hurt the surgical area. Apply some Vaseline, and use a bandage over the top. Take care to avoid pulling out the wick of the packed gauze. However, if it does come out, do not try to replace it. Dry the area well with a towel or hairdryer on cool setting. Cover the wound with a dry dressing as we expect the area to continue draining.    Please limit strenuous activity of this area to allow for optimal healing. Do not use Neosporin or other triple antibiotic ointments. Only use Vaseline.    Signs of  Infection:  Thankfully this is rare. However if you notice increasing redness, increasing swelling at the site, increasing pain or pain with movement, fever, chills or other signs of infection, please call us at: [phone].    ORAL ANTIBIOTIC  Take by mouth 1 capsule/tablet of cephalexin 500 mg three time(s) a day for 10 days. Make sure to finish the entire antibiotic regimen.    PAIN CONTROL INSTRUCTIONS    First, try either acetaminophen (Tylenol) or a non-steroidal anti-inflammatory drug (NSAID), such as ibuprofen (Advil) or naproxen (Aleve, Naprosyn).    Second, try combining both acetaminophen and an NSAID - often the combination will work better then either one alone.        PROCEDURES:                                                    Name : Incision and Drainage  Indication : Infected/inflamed sebaceous cyst.  Location(s) : Back, midline : 5 mm opening with yellowish drainage with surrounding 2 cm area of induration and erythema most consistent with an infected/inflamed epidermoid cyst that is tender.  Completed by : Kate Brambila MD  Anesthesia : Patient was anesthetized by infiltrating the area surrounding the lesion with 1% lidocaine.   epinephrine [id number] : Yes.  Buffered with bicarbonate : Yes.  Note : Discussed risks of the excision procedure, including infection and scarring. Discussed possible need for packing because of the inflammation.    During this procedure, the universal protocol was utilized. The patient's identity was confirmed by no less than two patient identifiers, correct procedure was verified, correct site was verified and marked as applicable and a final pause was completed.    Sterile technique was used throughout the procedure. The skin was cleaned and prepped with surgical cleanser. Once adequate anesthesia was obtained, the lesion was incised and drained. An incision was made with a #10 blade over the top of the lesion and copious amounts of white-yellow, foul-smelling,  cottage cheese-like drainage was expressed.    Culture was obtained.    Irrigated with sterile saline and sterile ribbon gauze packing was placed.     Following the procedure the patient become lightheaded, he was placed in a supine position and given fruit juice and animal crackers. After 10 minutes, he was able to sit up and walk without any symptoms. Discharged in stable condition.    No bleeding was present upon the completion of the procedure. The wound was coated with antibacterial ointment. A dry sterile dressing was applied. Patient was discharged in satisfactory condition.    Primary provider and referring provider will be informed regarding the wound culture and tissue sample report when it returns.    Return for re-evaluation in 2-3 days.  Antibiotic : Yes - cephalexin.      The information in this document, created by the medical scribe for me, accurately reflects the services I personally performed and the decisions made by me. I have reviewed and approved this document for accuracy prior to leaving the patient care area.  Kate Brambila MD February 22, 2017 9:20 AM  [facility] - PRIMARY CARE SKIN